# Patient Record
Sex: MALE | Race: OTHER | NOT HISPANIC OR LATINO | URBAN - METROPOLITAN AREA
[De-identification: names, ages, dates, MRNs, and addresses within clinical notes are randomized per-mention and may not be internally consistent; named-entity substitution may affect disease eponyms.]

---

## 2021-03-04 ENCOUNTER — EMERGENCY (EMERGENCY)
Facility: HOSPITAL | Age: 56
LOS: 1 days | Discharge: HOME | End: 2021-03-04
Attending: EMERGENCY MEDICINE | Admitting: EMERGENCY MEDICINE
Payer: MEDICAID

## 2021-03-04 VITALS
SYSTOLIC BLOOD PRESSURE: 136 MMHG | TEMPERATURE: 98 F | RESPIRATION RATE: 15 BRPM | DIASTOLIC BLOOD PRESSURE: 94 MMHG | OXYGEN SATURATION: 100 % | HEART RATE: 67 BPM

## 2021-03-04 LAB
ALBUMIN SERPL ELPH-MCNC: 4.7 G/DL — SIGNIFICANT CHANGE UP (ref 3.3–5)
ALP SERPL-CCNC: 86 U/L — SIGNIFICANT CHANGE UP (ref 40–120)
ALT FLD-CCNC: 42 U/L — HIGH (ref 4–41)
ANION GAP SERPL CALC-SCNC: 12 MMOL/L — SIGNIFICANT CHANGE UP (ref 7–14)
APTT BLD: 32 SEC — SIGNIFICANT CHANGE UP (ref 27–36.3)
AST SERPL-CCNC: 30 U/L — SIGNIFICANT CHANGE UP (ref 4–40)
BASOPHILS # BLD AUTO: 0.12 K/UL — SIGNIFICANT CHANGE UP (ref 0–0.2)
BASOPHILS NFR BLD AUTO: 1.3 % — SIGNIFICANT CHANGE UP (ref 0–2)
BILIRUB SERPL-MCNC: 0.5 MG/DL — SIGNIFICANT CHANGE UP (ref 0.2–1.2)
BUN SERPL-MCNC: 15 MG/DL — SIGNIFICANT CHANGE UP (ref 7–23)
CALCIUM SERPL-MCNC: 9.1 MG/DL — SIGNIFICANT CHANGE UP (ref 8.4–10.5)
CHLORIDE SERPL-SCNC: 103 MMOL/L — SIGNIFICANT CHANGE UP (ref 98–107)
CO2 SERPL-SCNC: 25 MMOL/L — SIGNIFICANT CHANGE UP (ref 22–31)
CREAT SERPL-MCNC: 0.98 MG/DL — SIGNIFICANT CHANGE UP (ref 0.5–1.3)
EOSINOPHIL # BLD AUTO: 0.63 K/UL — HIGH (ref 0–0.5)
EOSINOPHIL NFR BLD AUTO: 6.7 % — HIGH (ref 0–6)
GLUCOSE SERPL-MCNC: 67 MG/DL — LOW (ref 70–99)
HCT VFR BLD CALC: 48.2 % — SIGNIFICANT CHANGE UP (ref 39–50)
HGB BLD-MCNC: 14.4 G/DL — SIGNIFICANT CHANGE UP (ref 13–17)
IANC: 4.58 K/UL — SIGNIFICANT CHANGE UP (ref 1.5–8.5)
IMM GRANULOCYTES NFR BLD AUTO: 0.4 % — SIGNIFICANT CHANGE UP (ref 0–1.5)
INR BLD: 1.01 RATIO — SIGNIFICANT CHANGE UP (ref 0.88–1.16)
LYMPHOCYTES # BLD AUTO: 3.11 K/UL — SIGNIFICANT CHANGE UP (ref 1–3.3)
LYMPHOCYTES # BLD AUTO: 33.3 % — SIGNIFICANT CHANGE UP (ref 13–44)
MCHC RBC-ENTMCNC: 18.9 PG — LOW (ref 27–34)
MCHC RBC-ENTMCNC: 29.9 GM/DL — LOW (ref 32–36)
MCV RBC AUTO: 63.4 FL — LOW (ref 80–100)
MONOCYTES # BLD AUTO: 0.86 K/UL — SIGNIFICANT CHANGE UP (ref 0–0.9)
MONOCYTES NFR BLD AUTO: 9.2 % — SIGNIFICANT CHANGE UP (ref 2–14)
NEUTROPHILS # BLD AUTO: 4.58 K/UL — SIGNIFICANT CHANGE UP (ref 1.8–7.4)
NEUTROPHILS NFR BLD AUTO: 49.1 % — SIGNIFICANT CHANGE UP (ref 43–77)
NRBC # BLD: 0 /100 WBCS — SIGNIFICANT CHANGE UP
NRBC # FLD: 0 K/UL — SIGNIFICANT CHANGE UP
PLATELET # BLD AUTO: 247 K/UL — SIGNIFICANT CHANGE UP (ref 150–400)
POTASSIUM SERPL-MCNC: 3.8 MMOL/L — SIGNIFICANT CHANGE UP (ref 3.5–5.3)
POTASSIUM SERPL-SCNC: 3.8 MMOL/L — SIGNIFICANT CHANGE UP (ref 3.5–5.3)
PROT SERPL-MCNC: 7.7 G/DL — SIGNIFICANT CHANGE UP (ref 6–8.3)
PROTHROM AB SERPL-ACNC: 11.5 SEC — SIGNIFICANT CHANGE UP (ref 10.6–13.6)
RAPID RVP RESULT: SIGNIFICANT CHANGE UP
RBC # BLD: >7 M/UL — HIGH (ref 4.2–5.8)
RBC # FLD: 19.9 % — HIGH (ref 10.3–14.5)
SARS-COV-2 RNA SPEC QL NAA+PROBE: SIGNIFICANT CHANGE UP
SODIUM SERPL-SCNC: 140 MMOL/L — SIGNIFICANT CHANGE UP (ref 135–145)
TROPONIN T, HIGH SENSITIVITY RESULT: <6 NG/L — SIGNIFICANT CHANGE UP
TROPONIN T, HIGH SENSITIVITY RESULT: <6 NG/L — SIGNIFICANT CHANGE UP
WBC # BLD: 9.34 K/UL — SIGNIFICANT CHANGE UP (ref 3.8–10.5)
WBC # FLD AUTO: 9.34 K/UL — SIGNIFICANT CHANGE UP (ref 3.8–10.5)

## 2021-03-04 PROCEDURE — 99218: CPT | Mod: 25

## 2021-03-04 PROCEDURE — 71045 X-RAY EXAM CHEST 1 VIEW: CPT | Mod: 26

## 2021-03-04 PROCEDURE — 93010 ELECTROCARDIOGRAM REPORT: CPT

## 2021-03-04 RX ORDER — ASPIRIN/CALCIUM CARB/MAGNESIUM 324 MG
162 TABLET ORAL ONCE
Refills: 0 | Status: COMPLETED | OUTPATIENT
Start: 2021-03-04 | End: 2021-03-04

## 2021-03-04 RX ADMIN — Medication 162 MILLIGRAM(S): at 19:35

## 2021-03-04 NOTE — ED ADULT NURSE NOTE - OBJECTIVE STATEMENT
Pt to bed 14. Alert and oriented x 3. Pt c/o chest pain x 10 days. Progressively getting worse. IVL placed. Bloods drawn. Will continue to monitor.

## 2021-03-04 NOTE — ED ADULT NURSE NOTE - CHIEF COMPLAINT QUOTE
pt sent by  to r/o inferior wall mi. pt endorses left chest pain x 1 week. denies n/v, diaphoresis or sob. Mohawk speaking, son present as preferred

## 2021-03-04 NOTE — ED CDU PROVIDER INITIAL DAY NOTE - NS ED ATTENDING STATEMENT MOD
AFSANEH WRIGHT:  Carver was trying to reach this patient to reschedule him   She called patient's mom  I called pharmacy to see if they had different # for pt  I reviewed hospital notes  Was able to get several different #s for patient [phone]    First # ([phone]) was for Shirley, pt's POA she said  She said she schedules all of patients appointments and he is currently scheduling with providers to doctor shop because he wants to live independently and be able to drive again    She said pt has dementia and is on several psychotropic meds  Currently lives at [facility] in [place]    Told her we need to cancel appointment but if patient wants to be seen should callback to reschedule  Grecia RUSH RN       I have personally performed a face to face diagnostic evaluation on this patient. I have reviewed the ACP note and agree with the history, exam and plan of care, except as noted.

## 2021-03-04 NOTE — ED PROVIDER NOTE - PHYSICAL EXAMINATION
General: alert, conversant, looks well, vitals reassuring  Head: atraumatic, normocephalic  Eyes: PERRL, EOMI, no scleral icterus  ENT: no epistaxis, moist mucous membranes, normal phonation, airway patent  Neck: full ROM, no midline ttp  CV: RRR, no murmurs, HDS  Pulm: lungs CTA b/l, no wheezing, no respiratory distress, no chest wall ttp, no rashes   GI: abd soft, non tender, no guarding/rebound/masses  Back: normal ROM, no midline ttp, no signs of trauma  Extremities: normal ROM, joints stable, distal pulses intact, no edema  Neuro: alert, oriented x3, moving all extremities, interactive, normal speech/memory/gait   Derm: warm, dry, normal color, no rash/wounds

## 2021-03-04 NOTE — ED PROVIDER NOTE - OBJECTIVE STATEMENT
55 yoM, no significant PMHx. 55 yoM, no significant PMHx, presenting with 1-2 weeks of intermittent L sided chest pain. No radiation or diaphoresis, NV. Sometimes worse with exertion. Smokes tobacco daily. No drugs/etoh. No hx of VTE. No abd/back pain. No fever, dyspnea, cough, recent Covid exposure. No prior cardiac eval. Sent from PCP for possible ekg changes, PCP- NANCY Fermin. Son helping with translation on phone, Christi.

## 2021-03-04 NOTE — ED ADULT TRIAGE NOTE - CHIEF COMPLAINT QUOTE
pt sent by  to r/o inferior wall mi. pt endorses left chest pain x 1 week. denies n/v, diaphoresis or sob. Icelandic speaking, son present as preferred

## 2021-03-04 NOTE — ED CDU PROVIDER INITIAL DAY NOTE - OBJECTIVE STATEMENT
55 yoM, no significant PMHx, presenting with 1-2 weeks of intermittent L sided chest pain. No radiation or diaphoresis, NV. Sometimes worse with exertion. Smokes tobacco daily. No drugs/etoh. No hx of VTE. No abd/back pain. No fever, dyspnea, cough, recent Covid exposure. No prior cardiac eval. Sent from PCP for possible ekg changes, PCP- NANCY Fermin. Son helping with translation on phone, Christi.    CDU MAIKEL Nascimento: Agree with above note. 56 yo M daily smoker, with no sig PMHX, here with intermittent, non radiating, non pleuritic, sometimes exertional, sometimes at rest left sided chest pain x 10 days. Denies associated nausea,vomiting, diaphoresis, sob, lightheadedness, abdominal pain, back pain, cough, fever. PCP sent patient in for abnormal or changed EKG, ? inferior TWA.   In ED EKG TWI III, otherwise non ischemic. trop x 2 <6, cxr clear lungs. CDU for tele, stress, TDD eval in the am.

## 2021-03-04 NOTE — ED CDU PROVIDER INITIAL DAY NOTE - PHYSICAL EXAMINATION

## 2021-03-04 NOTE — ED ADULT NURSE NOTE - NSIMPLEMENTINTERV_GEN_ALL_ED
Implemented All Universal Safety Interventions:  [place] to call system. Call bell, personal items and telephone within reach. Instruct patient to call for assistance. Room bathroom lighting operational. Non-slip footwear when patient is off stretcher. Physically safe environment: no spills, clutter or unnecessary equipment. Stretcher in lowest position, wheels locked, appropriate side rails in place.

## 2021-03-04 NOTE — ED PROVIDER NOTE - CLINICAL SUMMARY MEDICAL DECISION MAKING FREE TEXT BOX
Jan, PGY3- 1-2 weeks of intermittent L sided cp. Sometimes worse with exertion. RF- age, daily tobacco use. EKG changes in PCP office. No prior cardiac eval. No back/abd pain, not consistent with aortic disease. No hx of VTE. No drug/etoh use. EKG in ED without concerning ischemic changes. Eval for NSTEMI, obtain provocative testing. Pt and family amenable to plan.

## 2021-03-04 NOTE — ED ADULT NURSE REASSESSMENT NOTE - NS ED NURSE REASSESS COMMENT FT1
Report received from day shift RN. Patient is A&Ox4. Placed patient on cardiac monitor, sinus rhythm. Denies chest pain. Respirations even and unlabored. Stretcher in lowest position, wheels locked, side rails up, call bell in reach. Dimmed lights for comfort.

## 2021-03-04 NOTE — ED CDU PROVIDER INITIAL DAY NOTE - DETAILS
56 yo M daily smoker, with no sig PMHX, here with intermittent, non radiating, non pleuritic, sometimes exertional, sometimes at rest left sided chest pain x 10 days.   PCP sent patient in for abnormal or changed EKG, ? inferior TWA.  In ED EKG TWI III, otherwise non ischemic. trop x 2 <6, cxr clear lungs. CDU for tele, stress, TDD eval in the am.

## 2021-03-05 VITALS
TEMPERATURE: 98 F | HEART RATE: 63 BPM | OXYGEN SATURATION: 98 % | SYSTOLIC BLOOD PRESSURE: 115 MMHG | RESPIRATION RATE: 16 BRPM | DIASTOLIC BLOOD PRESSURE: 72 MMHG

## 2021-03-05 LAB
B PERT DNA SPEC QL NAA+PROBE: SIGNIFICANT CHANGE UP
C PNEUM DNA SPEC QL NAA+PROBE: SIGNIFICANT CHANGE UP
CHOLEST SERPL-MCNC: 177 MG/DL — SIGNIFICANT CHANGE UP
FLUAV SUBTYP SPEC NAA+PROBE: SIGNIFICANT CHANGE UP
FLUBV RNA SPEC QL NAA+PROBE: SIGNIFICANT CHANGE UP
HADV DNA SPEC QL NAA+PROBE: SIGNIFICANT CHANGE UP
HCOV 229E RNA SPEC QL NAA+PROBE: SIGNIFICANT CHANGE UP
HCOV HKU1 RNA SPEC QL NAA+PROBE: SIGNIFICANT CHANGE UP
HCOV NL63 RNA SPEC QL NAA+PROBE: SIGNIFICANT CHANGE UP
HCOV OC43 RNA SPEC QL NAA+PROBE: SIGNIFICANT CHANGE UP
HDLC SERPL-MCNC: 32 MG/DL — LOW
HMPV RNA SPEC QL NAA+PROBE: SIGNIFICANT CHANGE UP
HPIV1 RNA SPEC QL NAA+PROBE: SIGNIFICANT CHANGE UP
HPIV2 RNA SPEC QL NAA+PROBE: SIGNIFICANT CHANGE UP
HPIV3 RNA SPEC QL NAA+PROBE: SIGNIFICANT CHANGE UP
HPIV4 RNA SPEC QL NAA+PROBE: SIGNIFICANT CHANGE UP
LIPID PNL WITH DIRECT LDL SERPL: 104 MG/DL — HIGH
NON HDL CHOLESTEROL: 145 MG/DL — HIGH
RSV RNA SPEC QL NAA+PROBE: SIGNIFICANT CHANGE UP
RV+EV RNA SPEC QL NAA+PROBE: SIGNIFICANT CHANGE UP
TRIGL SERPL-MCNC: 203 MG/DL — HIGH

## 2021-03-05 PROCEDURE — 93018 CV STRESS TEST I&R ONLY: CPT | Mod: GC

## 2021-03-05 PROCEDURE — 99217: CPT

## 2021-03-05 PROCEDURE — 78452 HT MUSCLE IMAGE SPECT MULT: CPT | Mod: 26

## 2021-03-05 PROCEDURE — 93016 CV STRESS TEST SUPVJ ONLY: CPT | Mod: GC

## 2021-03-05 NOTE — CONSULT NOTE ADULT - SUBJECTIVE AND OBJECTIVE BOX
CARDIOLOGY CONSULT - Dr. Green         HPI:      PAST MEDICAL & SURGICAL HISTORY:  No significant past medical history            PREVIOUS DIAGNOSTIC TESTING:    [ ] Echocardiogram:  [ ]  Catheterization:  [ ] Stress Test:  	    MEDICATIONS:  Home Medications:      MEDICATIONS  (STANDING):      FAMILY HISTORY:      SOCIAL HISTORY:    [ ] Non-smoker  [ ] Smoker  [ ] Alcohol    Allergies    Allergy Status Unknown    Intolerances    	    REVIEW OF SYSTEMS:  CONSTITUTIONAL: No fever, weight loss, or fatigue  EYES: No eye pain, visual disturbances, or discharge  ENMT:  No difficulty hearing, tinnitus, vertigo; No sinus or throat pain  NECK: No pain or stiffness  RESPIRATORY: No cough, wheezing, chills or hemoptysis; No Shortness of Breath  CARDIOVASCULAR: No chest pain, palpitations, passing out, dizziness, or leg swelling  GASTROINTESTINAL: No abdominal or epigastric pain. No nausea, vomiting, or hematemesis; No diarrhea or constipation. No melena or hematochezia.  GENITOURINARY: No dysuria, frequency, hematuria, or incontinence  NEUROLOGICAL: No headaches, memory loss, loss of strength, numbness, or tremors  SKIN: No itching, burning, rashes, or lesions   	    [ ] All others negative	  [ ] Unable to obtain    PHYSICAL EXAM:  T(C): 36.8 (03-05-21 @ 10:45), Max: 36.8 (03-05-21 @ 05:54)  HR: 63 (03-05-21 @ 10:45) (55 - 96)  BP: 115/72 (03-05-21 @ 10:45) (102/68 - 136/94)  RR: 16 (03-05-21 @ 10:45) (15 - 18)  SpO2: 98% (03-05-21 @ 10:45) (98% - 100%)  Wt(kg): --  I&O's Summary      Appearance: Normal	  Psychiatry: A & O x 3, Mood & affect appropriate  HEENT:   Normal oral mucosa, PERRL, EOMI	  Lymphatic: No lymphadenopathy  Cardiovascular: Normal S1 S2,RRR, No JVD, No murmurs  Respiratory: Lungs clear to auscultation	  Gastrointestinal:  Soft, Non-tender, + BS	  Skin: No rashes, No ecchymoses, No cyanosis	  Neurologic: Non-focal  Extremities: Normal range of motion, No clubbing, cyanosis or edema  Vascular: Peripheral pulses palpable 2+ bilaterally    TELEMETRY: 	    ECG:  	  RADIOLOGY:  OTHER: 	  	  LABS:	 	    CARDIAC MARKERS:  Troponin T, High Sensitivity Result: <6 ng/L (03-04 @ 20:12)  Troponin T, High Sensitivity Result: <6 ng/L (03-04 @ 19:11)                                  14.4   9.34  )-----------( 247      ( 04 Mar 2021 19:11 )             48.2     03-04    140  |  103  |  15  ----------------------------<  67<L>  3.8   |  25  |  0.98    Ca    9.1      04 Mar 2021 19:11    TPro  7.7  /  Alb  4.7  /  TBili  0.5  /  DBili  x   /  AST  30  /  ALT  42<H>  /  AlkPhos  86  03-04    PT/INR - ( 04 Mar 2021 19:11 )   PT: 11.5 sec;   INR: 1.01 ratio         PTT - ( 04 Mar 2021 19:11 )  PTT:32.0 sec  proBNP:   Lipid Profile:   HgA1c:   TSH:        CARDIOLOGY CONSULT - Dr. Green         HPI:  55 yoM, no significant PMHx, presenting with  10 days of intermittent L sided chest pain. No radiation, diaphoresis or NV. Reports pain is intermittent, associated with SOB sometimes with exertion. + smoker . No hx of VTE. No abd/back pain. No fever, dyspnea, cough, recent Covid exposure. No prior cardiac eval. Sent from PCP for possible ekg changes, PCP- NANCY Fermin.   on exam denies any chest pain or sob   ROS otherwise negative     PAST MEDICAL & SURGICAL HISTORY:  No significant past medical history            PREVIOUS DIAGNOSTIC TESTING:    [ ] Echocardiogram:  [ ]  Catheterization:  [ ] Stress Test:  	    MEDICATIONS:  Home Medications:      MEDICATIONS  (STANDING):      FAMILY HISTORY:      SOCIAL HISTORY:    [ ] + smoker     Allergies    Allergy Status Unknown    Intolerances    	    REVIEW OF SYSTEMS:  CONSTITUTIONAL: No fever, weight loss, or fatigue  EYES: No eye pain, visual disturbances, or discharge  ENMT:  No difficulty hearing, tinnitus, vertigo; No sinus or throat pain  NECK: No pain or stiffness  RESPIRATORY: No cough, wheezing, chills or hemoptysis; No Shortness of Breath  CARDIOVASCULAR:  see hpi   GASTROINTESTINAL: No abdominal or epigastric pain. No nausea, vomiting, or hematemesis; No diarrhea or constipation. No melena or hematochezia.  GENITOURINARY: No dysuria, frequency, hematuria, or incontinence  NEUROLOGICAL: No headaches, memory loss, loss of strength, numbness, or tremors  SKIN: No itching, burning, rashes, or lesions   	    [ x] All others negative	  [ ] Unable to obtain    PHYSICAL EXAM:  T(C): 36.8 (03-05-21 @ 10:45), Max: 36.8 (03-05-21 @ 05:54)  HR: 63 (03-05-21 @ 10:45) (55 - 96)  BP: 115/72 (03-05-21 @ 10:45) (102/68 - 136/94)  RR: 16 (03-05-21 @ 10:45) (15 - 18)  SpO2: 98% (03-05-21 @ 10:45) (98% - 100%)  Wt(kg): --  I&O's Summary      Appearance: Normal	  Psychiatry: A & O x 3, Mood & affect appropriate  HEENT:   Normal oral mucosa, PERRL, EOMI	  Lymphatic: No lymphadenopathy  Cardiovascular: Normal S1 S2,RRR, No JVD, No murmurs  Respiratory: Lungs clear to auscultation	  Gastrointestinal:  Soft, Non-tender, + BS	  Skin: No rashes, No ecchymoses, No cyanosis	  Neurologic: Non-focal  Extremities: Normal range of motion, No clubbing, cyanosis or edema  Vascular: Peripheral pulses palpable 2+ bilaterally    TELEMETRY: NSR      ECG:  nsr hr 60 bpm, twi lead III	  RADIOLOGY:      < from: Nuclear Stress Test-Exercise (Nuclear Stress Test-Exercise .) (03.05.21 @ 09:05) >  GATED ANALYSIS:  Post-stress gated wall motion analysis was performed (LVEF  = 67 %;LVEDV = 76 ml.), revealing normal LV function.  There was no segmental wall motion abnormality. RV  function appeared normal.  ------------------------------------------------------------------------  IMPRESSIONS:Normal Study  * Myocardial Perfusion SPECT results are normal at 88 % of  MPHR.  * Review of raw data shows: The study is of good technical  quality.  * The left ventricle was normal in size. Normal myocardial  perfusion scan,with no evidence of infarction or inducible  ischemia.  * Post-stress gated wall motion analysis was performed  (LVEF = 67 %;LVEDV = 76 ml.), revealing normal LV  function. There was no segmental wall motion abnormality.  RV function appeared normal.  * NOTE: Frequent VPDs occurred during stress and recovery.  Frequency of VPDs increased with stress.  ------------------------------------------------------------------------  Confirmed on  3/5/2021 - 10:40:30 by Chidi Resendiz M.D.  ------------------------------------------------------------------------    < end of copied text >    OTHER: 	  	  LABS:	 	    CARDIAC MARKERS:  Troponin T, High Sensitivity Result: <6 ng/L (03-04 @ 20:12)  Troponin T, High Sensitivity Result: <6 ng/L (03-04 @ 19:11)                                  14.4   9.34  )-----------( 247      ( 04 Mar 2021 19:11 )             48.2     03-04    140  |  103  |  15  ----------------------------<  67<L>  3.8   |  25  |  0.98    Ca    9.1      04 Mar 2021 19:11    TPro  7.7  /  Alb  4.7  /  TBili  0.5  /  DBili  x   /  AST  30  /  ALT  42<H>  /  AlkPhos  86  03-04    PT/INR - ( 04 Mar 2021 19:11 )   PT: 11.5 sec;   INR: 1.01 ratio         PTT - ( 04 Mar 2021 19:11 )  PTT:32.0 sec  proBNP:   Lipid Profile:   HgA1c:   TSH:

## 2021-03-05 NOTE — ED CDU PROVIDER SUBSEQUENT DAY NOTE - HISTORY
MAIKEL Tariq: pt seen and cleared by cardiology; pt to follow up outpatient.  Stress test negative.  Pt feels better ambulating without difficulty.  Results reviewed with patient.  Discharge reviewed and discussed with patient.  Conducted via   ID# MAIKEL Tariq: pt seen and cleared by cardiology; pt to follow up outpatient.  Stress test negative.  Pt feels better ambulating without difficulty.  Results reviewed with patient.  Discharge reviewed and discussed with patient.  Conducted via   ID# [id number].

## 2021-03-05 NOTE — CONSULT NOTE ADULT - ASSESSMENT
incomplete noted :   Stress test nl. no evidence of ischemia or infarct   HS trop neg, ecg with no acs   dcp per CDU    55 yoM, no significant PMHx, presenting with chest pain, sob     1 Chest pain   -associated with SOB   -hs trop negative, no acs   - RVP negative   -no decomp CHF on exam   -Chest xray unremarkable   -Stress test  Today normal with  no evidence of ischemia or infarct   -can check echo as outpt   -Smoking cessation offered     dcp per CDU

## 2021-03-05 NOTE — ED CDU PROVIDER DISPOSITION NOTE - PATIENT PORTAL LINK FT
You can access the FollowMyHealth Patient Portal offered by [facility] by registering at the following website: [email or web address]. By joining [employer]’s FollowMyHealth portal, you will also be able to view your health information using other applications (apps) compatible with our system.

## 2021-03-05 NOTE — ED CDU PROVIDER DISPOSITION NOTE - CLINICAL COURSE
Patient is a 54 yo M, Yoruba speaking with no chronic medical problems here for evaluation of intermittent left sided chest pain for 1-2 weeks. Pain is sharp, lasting seconds, non-radiating, sometimes worse with exertion. He smokes tobacco daily. No prior cardiac evaluation. Patient was evaluated in the ED. EKG was non-ischemic. Trop < 6 x2. CXR w/o acute findings. Patient in CDU for stress, tele monitoring, tele doc evaluation. Stress test negative, evaluated by tele doc. Stable for discharge with instructions to take 81 mg aspirin daily. All results discussed. I used Yoruba  [id number].

## 2021-03-05 NOTE — ED CDU PROVIDER DISPOSITION NOTE - CARE PROVIDER_API CALL
Cameron Green)  Cardiology  [address], [address]  [place] [address]  Phone: [phone]  Fax: [phone]  Follow Up Time:

## 2021-03-05 NOTE — ED CDU PROVIDER DISPOSITION NOTE - NSFOLLOWUPINSTRUCTIONS_ED_ALL_ED_FT
Follow up with your Doctor in 1-2 days.    Follow up with cardiology Dr Green in 1-2 days.    Return to the ER for any persistent/worsening or new symptoms, chest pain, shortness of breath, palpitations, dizziness or any concerning symptoms.

## 2021-03-05 NOTE — ED CDU PROVIDER DISPOSITION NOTE - ATTENDING CONTRIBUTION TO CARE
Patient is a 56 yo M, Khmer speaking with no chronic medical problems here for evaluation of intermittent left sided chest pain for 1-2 weeks. Pain is sharp, lasting seconds, non-radiating, sometimes worse with exertion. He smokes tobacco daily. No prior cardiac evaluation. Patient was evaluated in the ED. EKG was non-ischemic. Trop < 6 x2. CXR w/o acute findings.   VS noted  Gen. no acute distress, Non toxic   HEENT: EOMI, mmm  Lungs: CTAB/L no C/ W /R   CVS: RRR   Abd; Soft non tender, non distended   Ext: no edema, no calf tenderness  Skin: no rash  Neuro AAOx3 non focal clear speech  a/p: intermittent chest pain: Patient in CDU for stress, tele monitoring, tele doc evaluation. Stress test negative, evaluated by tele doc. Stable for discharge with instructions to take 81 mg aspirin daily. All results discussed. I used Khmer  [id number].   - Yung MILAN

## 2021-03-05 NOTE — ED CDU PROVIDER SUBSEQUENT DAY NOTE - ATTENDING CONTRIBUTION TO CARE
Patient is a 54 yo M, Albanian speaking with no chronic medical problems here for evaluation of intermittent left sided chest pain for 1-2 weeks. Pain is sharp, lasting seconds, non-radiating, sometimes worse with exertion. He smokes tobacco daily. No prior cardiac evaluation. Patient was evaluated in the ED. EKG was non-ischemic. Trop < 6 x2. CXR w/o acute findings.     VS noted  Gen. no acute distress, Non toxic   HEENT: EOMI, mmm,   Lungs: CTAB/L no C/ W /R   CVS: RRR   Abd; Soft non tender, non distended   Ext: no edema, no calf tenderness  Skin: no rash  Neuro AAOx3 non focal clear speech  a/p: chest pain, intermittent, sharp. Patient in CDU for stress, tele monitoring, tele doc evaluation. Stress test negative, evaluated by tele doc. Stable for discharge with instructions to take 81 mg aspirin daily. All results discussed. I used Albanian  [id number].   - Yung MILAN

## 2023-03-07 PROBLEM — Z00.00 ENCOUNTER FOR PREVENTIVE HEALTH EXAMINATION: Status: ACTIVE | Noted: 2023-03-07

## 2023-03-09 ENCOUNTER — APPOINTMENT (OUTPATIENT)
Dept: UROLOGY | Facility: CLINIC | Age: 58
End: 2023-03-09
Payer: MEDICAID

## 2023-03-09 VITALS
DIASTOLIC BLOOD PRESSURE: 85 MMHG | SYSTOLIC BLOOD PRESSURE: 130 MMHG | BODY MASS INDEX: 27.49 KG/M2 | HEART RATE: 58 BPM | HEIGHT: 67 IN | WEIGHT: 175.13 LBS | TEMPERATURE: 97.7 F | OXYGEN SATURATION: 100 % | RESPIRATION RATE: 16 BRPM

## 2023-03-09 DIAGNOSIS — F17.200 NICOTINE DEPENDENCE, UNSPECIFIED, UNCOMPLICATED: ICD-10-CM

## 2023-03-09 DIAGNOSIS — N50.89 OTHER SPECIFIED DISORDERS OF THE MALE GENITAL ORGANS: ICD-10-CM

## 2023-03-09 PROCEDURE — 99204 OFFICE O/P NEW MOD 45 MIN: CPT | Mod: 25

## 2023-03-09 PROCEDURE — 76870 US EXAM SCROTUM: CPT

## 2023-03-09 NOTE — PHYSICAL EXAM
[General Appearance - Well Developed] : well developed [General Appearance - Well Nourished] : well nourished [Normal Appearance] : normal appearance [Well Groomed] : well groomed [General Appearance - In No Acute Distress] : no acute distress [Edema] : no peripheral edema [Respiration, Rhythm And Depth] : normal respiratory rhythm and effort [Exaggerated Use Of Accessory Muscles For Inspiration] : no accessory muscle use [Abdomen Soft] : soft [Abdomen Tenderness] : non-tender [Costovertebral Angle Tenderness] : no ~M costovertebral angle tenderness [Urethral Meatus] : meatus normal [Penis Abnormality] : normal circumcised penis [Urinary Bladder Findings] : the bladder was normal on palpation [Testes Mass (___cm)] : there were no testicular masses [Normal Station and Gait] : the gait and station were normal for the patient's age [] : no rash [No Focal Deficits] : no focal deficits [Oriented To Time, Place, And Person] : oriented to person, place, and time [Affect] : the affect was normal [Mood] : the mood was normal [Not Anxious] : not anxious [FreeTextEntry1] : deferred exam given main concern right hemiscrotum. right scrotum enlarged, nontender. cannot palpate testicle, epid

## 2023-03-09 NOTE — ASSESSMENT
[FreeTextEntry1] : Scrotal US reviewed today: right hydrocele, simple. Testes wnl bilat. Right epid incompletely visualized, but no sig lesions. No evidence for hematocele\par \par Reassured- benign appearing findings.\par \par Can observe safely, manage with surgery if becomes symptomatic\par \par we also reviewed screening for prostate cancer, with Psa\par \par unsure if he's had any w/u with a pcp\par will check and see if any psa\par \par rpa in approx 4 to 6 months with psa and full exam (if psa not already done)

## 2023-03-09 NOTE — HISTORY OF PRESENT ILLNESS
[None] : no symptoms [FreeTextEntry1] : KENYA BEY is a 57 year M who presents for new finding swollen right scrotum over past ~ 10 days. Had not noticed before. Denies pain or trauma. No voiding issues- no dysuria, hematuria. No flank or abdominal pain. No prior history of testic swelling.\par \par Overall very healthy\par \par PMH: no sig reported\par PSH: no sig\par FH: no sig  FH\par Meds: none\par SH: current smoker, , 2 children, no ETOH\par Allergies: nkda\par

## 2023-05-10 ENCOUNTER — NON-APPOINTMENT (OUTPATIENT)
Age: 58
End: 2023-05-10

## 2023-05-17 ENCOUNTER — OUTPATIENT (OUTPATIENT)
Dept: OUTPATIENT SERVICES | Facility: HOSPITAL | Age: 58
LOS: 1 days | End: 2023-05-17
Payer: MEDICAID

## 2023-05-17 ENCOUNTER — APPOINTMENT (OUTPATIENT)
Dept: ULTRASOUND IMAGING | Facility: IMAGING CENTER | Age: 58
End: 2023-05-17
Payer: MEDICAID

## 2023-05-17 ENCOUNTER — APPOINTMENT (OUTPATIENT)
Dept: UROLOGY | Facility: CLINIC | Age: 58
End: 2023-05-17
Payer: MEDICAID

## 2023-05-17 DIAGNOSIS — N43.3 HYDROCELE, UNSPECIFIED: ICD-10-CM

## 2023-05-17 DIAGNOSIS — K40.90 UNILATERAL INGUINAL HERNIA, W/OUT OBSTRUCTION OR GANGRENE, NOT SPECIFIED AS RECURRENT: ICD-10-CM

## 2023-05-17 DIAGNOSIS — K40.90 UNILATERAL INGUINAL HERNIA, WITHOUT OBSTRUCTION OR GANGRENE, NOT SPECIFIED AS RECURRENT: ICD-10-CM

## 2023-05-17 PROCEDURE — 76870 US EXAM SCROTUM: CPT | Mod: 26

## 2023-05-17 PROCEDURE — 76870 US EXAM SCROTUM: CPT

## 2023-05-17 PROCEDURE — 99214 OFFICE O/P EST MOD 30 MIN: CPT

## 2023-05-19 NOTE — HISTORY OF PRESENT ILLNESS
[None] : no symptoms [FreeTextEntry1] : KENYA BEY is a 57 year M who presents for new finding swollen right scrotum over past ~ 10 days. Had not noticed before. Denies pain or trauma. No voiding issues- no dysuria, hematuria. No flank or abdominal pain. No prior history of testic swelling.\par \par Feels right hydrocele is larger over the past 2 months, and feels discomfort when sitting, but also with some discomfort when standing. \par \par No pain with lifting, exercise, coughing/valsalva, but has changed over past 2 months to include sig right inguinal pain, seems continuous.

## 2023-05-19 NOTE — PHYSICAL EXAM
[General Appearance - Well Developed] : well developed [General Appearance - Well Nourished] : well nourished [Normal Appearance] : normal appearance [Well Groomed] : well groomed [General Appearance - In No Acute Distress] : no acute distress [Abdomen Soft] : soft [Abdomen Tenderness] : non-tender [Costovertebral Angle Tenderness] : no ~M costovertebral angle tenderness [Edema] : no peripheral edema [] : no respiratory distress [Respiration, Rhythm And Depth] : normal respiratory rhythm and effort [Exaggerated Use Of Accessory Muscles For Inspiration] : no accessory muscle use [Oriented To Time, Place, And Person] : oriented to person, place, and time [Affect] : the affect was normal [Mood] : the mood was normal [Not Anxious] : not anxious [Normal Station and Gait] : the gait and station were normal for the patient's age [No Focal Deficits] : no focal deficits [FreeTextEntry1] : more tense hydrocele, but also with tender thickened cord into ing canal

## 2023-05-19 NOTE — ASSESSMENT
[FreeTextEntry1] : Unclear if any hernia, or incarcerated.\par \par Hydroceles not usually acutely tender. Will ref to gen surgery, but if pain cont and significant may recommend\par ER for earlier acute eval\par \par will repeat scrotal/groin ultrasound to eval, and review by phone\par \par